# Patient Record
Sex: FEMALE | Race: WHITE | NOT HISPANIC OR LATINO | Employment: OTHER | URBAN - METROPOLITAN AREA
[De-identification: names, ages, dates, MRNs, and addresses within clinical notes are randomized per-mention and may not be internally consistent; named-entity substitution may affect disease eponyms.]

---

## 2020-01-02 ENCOUNTER — OFFICE VISIT (OUTPATIENT)
Dept: ORTHOPEDICS | Facility: CLINIC | Age: 75
End: 2020-01-02
Payer: MEDICARE

## 2020-01-02 VITALS — BODY MASS INDEX: 28.77 KG/M2 | WEIGHT: 152.38 LBS | TEMPERATURE: 98 F | HEIGHT: 61 IN

## 2020-01-02 DIAGNOSIS — M20.21 HALLUX RIGIDUS OF RIGHT FOOT: ICD-10-CM

## 2020-01-02 DIAGNOSIS — M21.611 BUNION OF GREAT TOE OF RIGHT FOOT: Primary | ICD-10-CM

## 2020-01-02 DIAGNOSIS — M20.41 HAMMERTOE OF SECOND TOE OF RIGHT FOOT: ICD-10-CM

## 2020-01-02 LAB
BASOPHILS NFR SNV MANUAL: 0.8 % (ref 0–3)
BUN SERPL-MCNC: 20 MG/DL (ref 7–18)
BUN/CREAT SERPL: 20 RATIO (ref 7–18)
CALCIUM SERPL-MCNC: 9.6 MG/DL (ref 8.8–10.5)
CHLORIDE SERPL-SCNC: 104 MMOL/L (ref 100–108)
CO2 SERPL-SCNC: 31 MMOL/L (ref 21–32)
CREAT SERPL-MCNC: 1 MG/DL (ref 0.55–1.02)
EOSINOPHIL NFR SNV MANUAL: 2.8 % (ref 1–3)
ERYTHROCYTE [DISTWIDTH] IN BLOOD BY AUTOMATED COUNT: 13.9 % (ref 12.5–18)
GFR ESTIMATION: 54
GLUCOSE SERPL-MCNC: 111 MG/DL (ref 70–110)
HCT VFR BLD AUTO: 38.3 % (ref 37–47)
HGB BLD-MCNC: 12.6 G/DL (ref 12–16)
LYMPHOCYTES NFR SNV MANUAL: 21.1 % (ref 25–40)
MANUAL NRBC PER 100 CELLS: 0 %
MCH RBC QN AUTO: 30.7 PG (ref 27–31.2)
MCHC RBC AUTO-ENTMCNC: 32.9 G/DL (ref 31.8–35.4)
MCV RBC AUTO: 93.4 FL (ref 80–97)
MONOCYTES/100 LEUKOCYTES: 7.5 % (ref 1–15)
NEUTROPHILS NFR BLD: 5.81 10*3/UL (ref 1.8–7.7)
NEUTROPHILS NFR SNV MANUAL: 67.5 % (ref 37–80)
PLATELETS: 261 10*3/UL (ref 142–424)
POTASSIUM SERPL-SCNC: 4.7 MMOL/L (ref 3.6–5.2)
RBC # BLD AUTO: 4.1 10*6/UL (ref 4.2–5.4)
SODIUM BLD-SCNC: 141 MMOL/L (ref 135–145)
WBC # BLD: 8.6 10*3/UL (ref 4.6–10.2)

## 2020-01-02 PROCEDURE — 1159F PR MEDICATION LIST DOCUMENTED IN MEDICAL RECORD: ICD-10-PCS | Mod: S$GLB,,, | Performed by: ORTHOPAEDIC SURGERY

## 2020-01-02 PROCEDURE — 99201 PR OFFICE/OUTPT VISIT,NEW,LEVL I: ICD-10-PCS | Mod: S$GLB,,, | Performed by: ORTHOPAEDIC SURGERY

## 2020-01-02 PROCEDURE — 1159F MED LIST DOCD IN RCRD: CPT | Mod: S$GLB,,, | Performed by: ORTHOPAEDIC SURGERY

## 2020-01-02 PROCEDURE — 99201 PR OFFICE/OUTPT VISIT,NEW,LEVL I: CPT | Mod: S$GLB,,, | Performed by: ORTHOPAEDIC SURGERY

## 2020-01-02 RX ORDER — AMLODIPINE BESYLATE 10 MG/1
10 TABLET ORAL DAILY
COMMUNITY

## 2020-01-02 RX ORDER — ATORVASTATIN CALCIUM 40 MG/1
40 TABLET, FILM COATED ORAL DAILY
COMMUNITY

## 2020-01-02 RX ORDER — BENZTROPINE MESYLATE 1 MG/1
1 TABLET ORAL 2 TIMES DAILY
COMMUNITY

## 2020-01-02 RX ORDER — ASPIRIN 325 MG
325 TABLET ORAL DAILY
COMMUNITY

## 2020-01-02 RX ORDER — LOXAPINE SUCCINATE 50 MG/1
50 TABLET ORAL 3 TIMES DAILY
COMMUNITY

## 2020-01-02 RX ORDER — METOPROLOL TARTRATE 25 MG/1
25 TABLET, FILM COATED ORAL 2 TIMES DAILY
COMMUNITY

## 2020-01-08 ENCOUNTER — OUTSIDE PLACE OF SERVICE (OUTPATIENT)
Dept: ORTHOPEDICS | Facility: CLINIC | Age: 75
End: 2020-01-08
Payer: MEDICARE

## 2020-01-08 PROCEDURE — 28820 AMPUTATION OF TOE: CPT | Mod: 51,T6,, | Performed by: ORTHOPAEDIC SURGERY

## 2020-01-08 PROCEDURE — 28289 PR REPAIR HALLUX RIGIDUS; W/O IMPLANT: ICD-10-PCS | Mod: T5,,, | Performed by: ORTHOPAEDIC SURGERY

## 2020-01-08 PROCEDURE — 28820 PR AMPUTATION TOE,MT-P JT: ICD-10-PCS | Mod: 51,T6,, | Performed by: ORTHOPAEDIC SURGERY

## 2020-01-08 PROCEDURE — 28289 CORRJ HALUX RIGDUS W/O IMPLT: CPT | Mod: T5,,, | Performed by: ORTHOPAEDIC SURGERY

## 2020-01-14 ENCOUNTER — OFFICE VISIT (OUTPATIENT)
Dept: ORTHOPEDICS | Facility: CLINIC | Age: 75
End: 2020-01-14
Payer: MEDICARE

## 2020-01-14 DIAGNOSIS — M20.21 HALLUX RIGIDUS OF RIGHT FOOT: Primary | ICD-10-CM

## 2020-01-14 DIAGNOSIS — M20.41 HAMMERTOE OF SECOND TOE OF RIGHT FOOT: ICD-10-CM

## 2020-01-14 PROCEDURE — 99024 PR POST-OP FOLLOW-UP VISIT: ICD-10-PCS | Mod: S$GLB,POP,, | Performed by: ORTHOPAEDIC SURGERY

## 2020-01-14 PROCEDURE — 99024 POSTOP FOLLOW-UP VISIT: CPT | Mod: S$GLB,POP,, | Performed by: ORTHOPAEDIC SURGERY

## 2020-01-14 RX ORDER — CEFADROXIL 500 MG/1
CAPSULE ORAL
COMMUNITY
Start: 2020-01-08

## 2020-01-14 NOTE — PROGRESS NOTES
Subjective:      Patient ID: Milena Chaney is a 74 y.o. female.    Chief Complaint: Post-op Evaluation of the Right Foot    HPI the patient is 1 week postop amputation of her right 2nd toe and cheilectomy of the great toe.  She has not taken any pain medication    ROS unchanged from prior visit      Objective:      Incisions are both clean.  She has minimal swelling. She has slight bloody drainage from the incision at the 2nd toe      Ortho/SPM Exam            Assessment:       Encounter Diagnoses   Name Primary?    Hallux rigidus of right foot Yes    Hammertoe of second toe of right foot           Plan:       Milena was seen today for post-op evaluation.    Diagnoses and all orders for this visit:    Hallux rigidus of right foot    Hammertoe of second toe of right foot      Incisions are redressed.  She will be seen back in 1 week for suture removal

## 2020-01-21 ENCOUNTER — OFFICE VISIT (OUTPATIENT)
Dept: ORTHOPEDICS | Facility: CLINIC | Age: 75
End: 2020-01-21
Payer: MEDICARE

## 2020-01-21 DIAGNOSIS — M20.41 HAMMERTOE OF SECOND TOE OF RIGHT FOOT: ICD-10-CM

## 2020-01-21 DIAGNOSIS — M20.21 HALLUX RIGIDUS OF RIGHT FOOT: Primary | ICD-10-CM

## 2020-01-21 PROCEDURE — 99024 PR POST-OP FOLLOW-UP VISIT: ICD-10-PCS | Mod: S$GLB,POP,, | Performed by: ORTHOPAEDIC SURGERY

## 2020-01-21 PROCEDURE — 99024 POSTOP FOLLOW-UP VISIT: CPT | Mod: S$GLB,POP,, | Performed by: ORTHOPAEDIC SURGERY

## 2020-01-21 NOTE — PROGRESS NOTES
Subjective:      Patient ID: Milena Chaney is a 74 y.o. female.    Chief Complaint: Pain of the Right Foot and Follow-up    HPI patient is 2 weeks postop amputation of her 2nd toe and cheilectomy of her great toe.  She has minimal discomfort    ROS unchanged from prior visit      Objective:      The incisions are well healed.  She has about 40° of extension at the 1st MTP joint.      Ortho/SPM Exam            Assessment:       Encounter Diagnoses   Name Primary?    Hallux rigidus of right foot Yes    Hammertoe of second toe of right foot           Plan:       Milena was seen today for follow-up and pain.    Diagnoses and all orders for this visit:    Hallux rigidus of right foot    Hammertoe of second toe of right foot     Sutures are removed today.  She can begin weaning back into normal footwear.  Return 6 weeks for a final check

## 2020-03-03 ENCOUNTER — OFFICE VISIT (OUTPATIENT)
Dept: ORTHOPEDICS | Facility: CLINIC | Age: 75
End: 2020-03-03
Payer: MEDICARE

## 2020-03-03 DIAGNOSIS — M20.41 HAMMERTOE OF SECOND TOE OF RIGHT FOOT: Primary | ICD-10-CM

## 2020-03-03 PROCEDURE — 99024 PR POST-OP FOLLOW-UP VISIT: ICD-10-PCS | Mod: S$GLB,POP,, | Performed by: ORTHOPAEDIC SURGERY

## 2020-03-03 PROCEDURE — 99024 POSTOP FOLLOW-UP VISIT: CPT | Mod: S$GLB,POP,, | Performed by: ORTHOPAEDIC SURGERY

## 2020-03-03 NOTE — PROGRESS NOTES
Subjective:      Patient ID: Milena Chaney is a 74 y.o. female.    Chief Complaint: Post-op Evaluation of the Right Foot    HPI patient is 1 month postop amputation of her 2nd toe.  She is doing well.  She has no complaints    ROS    unchanged from prior visit  Objective:      Incisions well healed.  There is no swelling or evidence of other complication      Ortho/SPM Exam            Assessment:       Encounter Diagnosis   Name Primary?    Hammertoe of second toe of right foot Yes          Plan:       Milena was seen today for post-op evaluation.    Diagnoses and all orders for this visit:    Hammertoe of second toe of right foot     Return p.r.n.

## 2023-09-30 ENCOUNTER — OUTSIDE PLACE OF SERVICE (OUTPATIENT)
Dept: ADMINISTRATIVE | Facility: OTHER | Age: 78
End: 2023-09-30
Payer: MEDICARE

## 2023-09-30 PROCEDURE — 99223 PR INITIAL HOSPITAL CARE,LEVL III: ICD-10-PCS | Mod: ,,, | Performed by: FAMILY MEDICINE

## 2023-09-30 PROCEDURE — 99223 1ST HOSP IP/OBS HIGH 75: CPT | Mod: ,,, | Performed by: FAMILY MEDICINE

## 2023-10-08 ENCOUNTER — OUTSIDE PLACE OF SERVICE (OUTPATIENT)
Dept: ADMINISTRATIVE | Facility: OTHER | Age: 78
End: 2023-10-08
Payer: MEDICARE

## 2023-10-08 ENCOUNTER — LAB REQUISITION (OUTPATIENT)
Dept: LAB | Facility: HOSPITAL | Age: 78
End: 2023-10-08
Payer: MEDICARE

## 2023-10-08 DIAGNOSIS — E86.0 DEHYDRATION: ICD-10-CM

## 2023-10-08 LAB
ANION GAP SERPL CALC-SCNC: 10 MEQ/L (ref 2–13)
BUN SERPL-MCNC: 13 MG/DL (ref 7–20)
CALCIUM SERPL-MCNC: 9.5 MG/DL (ref 8.4–10.2)
CHLORIDE SERPL-SCNC: 103 MMOL/L (ref 98–110)
CO2 SERPL-SCNC: 26 MMOL/L (ref 21–32)
CREAT SERPL-MCNC: 0.68 MG/DL (ref 0.66–1.25)
CREAT/UREA NIT SERPL: 19 (ref 12–20)
GFR SERPLBLD CREATININE-BSD FMLA CKD-EPI: 89 MLS/MIN/1.73/M2
GLUCOSE SERPL-MCNC: 129 MG/DL (ref 70–115)
POTASSIUM SERPL-SCNC: 3.3 MMOL/L (ref 3.5–5.1)
SODIUM SERPL-SCNC: 139 MMOL/L (ref 135–145)

## 2023-10-08 PROCEDURE — 80048 BASIC METABOLIC PNL TOTAL CA: CPT | Performed by: FAMILY MEDICINE

## 2023-10-08 PROCEDURE — 99231 PR SUBSEQUENT HOSPITAL CARE,LEVL I: ICD-10-PCS | Mod: ,,, | Performed by: FAMILY MEDICINE

## 2023-10-08 PROCEDURE — 99231 SBSQ HOSP IP/OBS SF/LOW 25: CPT | Mod: ,,, | Performed by: FAMILY MEDICINE

## 2023-10-09 ENCOUNTER — OUTSIDE PLACE OF SERVICE (OUTPATIENT)
Dept: ADMINISTRATIVE | Facility: OTHER | Age: 78
End: 2023-10-09
Payer: MEDICARE

## 2023-10-09 PROCEDURE — 99231 PR SUBSEQUENT HOSPITAL CARE,LEVL I: ICD-10-PCS | Mod: ,,, | Performed by: FAMILY MEDICINE

## 2023-10-09 PROCEDURE — 99231 SBSQ HOSP IP/OBS SF/LOW 25: CPT | Mod: ,,, | Performed by: FAMILY MEDICINE

## 2023-10-11 ENCOUNTER — OUTSIDE PLACE OF SERVICE (OUTPATIENT)
Dept: ADMINISTRATIVE | Facility: OTHER | Age: 78
End: 2023-10-11
Payer: MEDICARE

## 2023-10-11 PROCEDURE — 99231 SBSQ HOSP IP/OBS SF/LOW 25: CPT | Mod: ,,, | Performed by: FAMILY MEDICINE

## 2023-10-11 PROCEDURE — 99231 PR SUBSEQUENT HOSPITAL CARE,LEVL I: ICD-10-PCS | Mod: ,,, | Performed by: FAMILY MEDICINE

## 2023-10-17 ENCOUNTER — HOSPITAL ENCOUNTER (OUTPATIENT)
Dept: RADIOLOGY | Facility: HOSPITAL | Age: 78
Discharge: HOME | End: 2023-10-17
Attending: PSYCHIATRY & NEUROLOGY
Payer: MEDICARE

## 2023-10-17 ENCOUNTER — OUTSIDE PLACE OF SERVICE (OUTPATIENT)
Dept: ADMINISTRATIVE | Facility: OTHER | Age: 78
End: 2023-10-17
Payer: MEDICARE

## 2023-10-17 DIAGNOSIS — R09.89 POOR CIRCULATION: ICD-10-CM

## 2023-10-17 PROCEDURE — 99231 SBSQ HOSP IP/OBS SF/LOW 25: CPT | Mod: ,,, | Performed by: FAMILY MEDICINE

## 2023-10-17 PROCEDURE — 93925 LOWER EXTREMITY STUDY: CPT | Mod: TC

## 2023-10-17 PROCEDURE — 99231 PR SUBSEQUENT HOSPITAL CARE,LEVL I: ICD-10-PCS | Mod: ,,, | Performed by: FAMILY MEDICINE

## 2023-12-20 ENCOUNTER — TELEPHONE (OUTPATIENT)
Dept: UROLOGY | Facility: CLINIC | Age: 78
End: 2023-12-20
Payer: MEDICARE

## 2023-12-21 ENCOUNTER — TELEPHONE (OUTPATIENT)
Dept: UROLOGY | Facility: CLINIC | Age: 78
End: 2023-12-21
Payer: MEDICARE

## 2024-01-03 ENCOUNTER — OFFICE VISIT (OUTPATIENT)
Dept: UROLOGY | Facility: CLINIC | Age: 79
End: 2024-01-03
Payer: MEDICARE

## 2024-01-03 VITALS
HEIGHT: 61 IN | HEART RATE: 109 BPM | WEIGHT: 152 LBS | BODY MASS INDEX: 28.7 KG/M2 | DIASTOLIC BLOOD PRESSURE: 71 MMHG | SYSTOLIC BLOOD PRESSURE: 127 MMHG | OXYGEN SATURATION: 97 %

## 2024-01-03 DIAGNOSIS — N32.81 OVERACTIVE BLADDER: Primary | ICD-10-CM

## 2024-01-03 PROCEDURE — 99214 OFFICE O/P EST MOD 30 MIN: CPT | Mod: S$GLB,,, | Performed by: FAMILY MEDICINE

## 2024-01-03 RX ORDER — ZINC SULFATE 50(220)MG
220 CAPSULE ORAL DAILY
COMMUNITY

## 2024-01-03 RX ORDER — ASCORBIC ACID 500 MG
500 TABLET ORAL 2 TIMES DAILY
COMMUNITY

## 2024-01-03 RX ORDER — ALPRAZOLAM 0.25 MG/1
0.25 TABLET ORAL 2 TIMES DAILY PRN
COMMUNITY
Start: 2024-01-02

## 2024-01-03 RX ORDER — RISPERIDONE 1 MG/1
1 TABLET, ORALLY DISINTEGRATING ORAL DAILY
COMMUNITY
Start: 2023-12-11

## 2024-01-03 RX ORDER — ARGININE/GLUTAMINE/CALCIUM BMB 7G-7G-1.5G
1 POWDER IN PACKET (EA) ORAL DAILY
COMMUNITY

## 2024-01-03 RX ORDER — TAMSULOSIN HYDROCHLORIDE 0.4 MG/1
0.4 CAPSULE ORAL NIGHTLY
COMMUNITY

## 2024-01-03 RX ORDER — RISPERIDONE 0.5 MG/1
0.5 TABLET, ORALLY DISINTEGRATING ORAL DAILY
COMMUNITY
Start: 2023-12-27

## 2024-01-03 RX ORDER — CARBIDOPA AND LEVODOPA 25; 100 MG/1; MG/1
1 TABLET, EXTENDED RELEASE ORAL 2 TIMES DAILY
COMMUNITY

## 2024-01-03 RX ORDER — TETRABENAZINE 12.5 MG/1
12.5 TABLET, COATED ORAL DAILY
COMMUNITY
Start: 2023-12-12

## 2024-01-03 RX ORDER — GUAIFENESIN 100 MG/5ML
15 SOLUTION ORAL EVERY 6 HOURS PRN
COMMUNITY

## 2024-01-03 RX ORDER — LEVOTHYROXINE SODIUM 25 UG/1
25 TABLET ORAL
COMMUNITY
Start: 2023-12-11

## 2024-01-04 DIAGNOSIS — N32.89 BLADDER MASS: Primary | ICD-10-CM

## 2024-01-09 ENCOUNTER — OUTSIDE PLACE OF SERVICE (OUTPATIENT)
Dept: FAMILY MEDICINE | Facility: CLINIC | Age: 79
End: 2024-01-09
Payer: MEDICARE

## 2024-01-09 ENCOUNTER — TELEPHONE (OUTPATIENT)
Dept: UROLOGY | Facility: CLINIC | Age: 79
End: 2024-01-09
Payer: MEDICARE

## 2024-01-09 PROCEDURE — 99223 1ST HOSP IP/OBS HIGH 75: CPT | Mod: ,,, | Performed by: FAMILY MEDICINE

## 2024-01-09 NOTE — TELEPHONE ENCOUNTER
Contacted, rescheduled pt. BJP    ----- Message from Shima Ayoub sent at 1/9/2024  9:52 AM CST -----  Contact: Catherine Alexander from [facility] is calling to reschedule patient Milena, whom was scheduled 01/10/2024. You can contact her to reschedule at [phone] ext [phone]

## 2024-01-18 ENCOUNTER — TELEPHONE (OUTPATIENT)
Dept: UROLOGY | Facility: CLINIC | Age: 79
End: 2024-01-18
Payer: MEDICARE

## 2024-01-18 NOTE — TELEPHONE ENCOUNTER
I rescheduled pt's apt to 1/25 @ 10:20. I called pt to let her know, however the phone number listed is no longer in service. Yoselin Shaw     ----- Message from Kemi Jackson sent at 1/18/2024 10:26 AM CST -----  Contact: [facility] is calling on behalf of pt. Pt is hospitalized and needs tomorrows appt rescheduled. Saw an opening for 1/25 @10:20 and would like it scheduled. Please return call to pt at [phone] for any further questions

## 2024-02-05 ENCOUNTER — OFFICE VISIT (OUTPATIENT)
Dept: UROLOGY | Facility: CLINIC | Age: 79
End: 2024-02-05
Payer: MEDICARE

## 2024-02-05 DIAGNOSIS — N32.89 BLADDER MASS: ICD-10-CM

## 2024-02-05 PROCEDURE — 99214 OFFICE O/P EST MOD 30 MIN: CPT | Mod: S$GLB,,, | Performed by: FAMILY MEDICINE

## 2024-02-05 NOTE — PROGRESS NOTES
Subjective:       Patient ID: Milena Chaney is a 78 y.o. female.    Chief Complaint: No chief complaint on file.      HPI: 78-year-old female patient presenting to the clinic to follow up.  This patient has been diagnosed with dementia and other psychologic issues.  She was a very poor historian and unable to provide valuable information.  We contacted nursing facility multiple times to try to determine the reason for referral and to obtain a history.  Referral states possible bladder mass however there was no other information.  Patient denies gross hematuria.  She had a recent CT with no abnormal urologic findings.  No bladder wall thickening no calcifications.  Attempted to obtain urine sample from the patient however she is unable to provide a sample.          Past Medical History:   Past Medical History:   Diagnosis Date    Acute embolism and thrombosis of unspecified deep veins of right lower extremity     Allergic rhinitis     Anxiety disorder, unspecified     Cancer     Constipation     Dementia     Depression     Drug induced subacute dyskinesia     Emotional lability     Hypercholesteremia     Hyperlipidemia     Hypertension     Hypothyroidism, unspecified     Insomnia     Major depressive disorder     Muscle weakness     Neurocognitive disorder with Lewy bodies     Parkinson's disease     Pressure-induced deep tissue damage of unspecified site     Restlessness and agitation     Unilateral osteoarthritis of hip, right     Unilateral osteoarthritis of knee     right    Urine retention        Past Surgical Historical:   Past Surgical History:   Procedure Laterality Date    FOOT SURGERY Right     Amputation of 2nd Toe    Removal of Skin Cancer of Face          Medications:   Medication List with Changes/Refills   Current Medications    ALPRAZOLAM (XANAX) 0.25 MG TABLET    Take 0.25 mg by mouth 2 (two) times daily as needed.    AMLODIPINE (NORVASC) 10 MG TABLET    Take 10 mg by mouth once daily.    APIXABAN  (ELIQUIS) 5 MG TAB    Take 5 mg by mouth 2 (two) times daily.    ARGININE-GLUTAMINE-CALCIUM HMB (CONSUELO) 7-7-1.5 GRAM PWPK    Take 1 packet by mouth once daily.    ASCORBIC ACID, VITAMIN C, (VITAMIN C) 500 MG TABLET    Take 500 mg by mouth 2 (two) times daily.    ASPIRIN 325 MG TABLET    Take 325 mg by mouth once daily.    ATORVASTATIN (LIPITOR) 40 MG TABLET    Take 40 mg by mouth once daily.    BENZTROPINE (COGENTIN) 1 MG TABLET    Take 1 mg by mouth 2 (two) times daily.    CARBIDOPA-LEVODOPA  MG (SINEMET CR)  MG TBSR    Take 1 tablet by mouth 2 (two) times daily.    CEFADROXIL (DURICEF) 500 MG CAP        GUAIFENESIN 100 MG/5 ML (ROBITUSSIN) 100 MG/5 ML SYRUP    Take 15 mLs by mouth every 6 (six) hours as needed for Cough.    LEVOTHYROXINE (SYNTHROID) 25 MCG TABLET    Take 25 mcg by mouth before breakfast.    LOXAPINE SUCCINATE (LOXITANE) 50 MG CAPSULE    Take 50 mg by mouth 3 (three) times daily.    METOPROLOL TARTRATE (LOPRESSOR) 25 MG TABLET    Take 25 mg by mouth 2 (two) times daily.    RISPERIDONE (RISPERDAL M-TABS) 0.5 MG TBDL    Take 0.5 mg by mouth once daily.    RISPERIDONE (RISPERDAL M-TABS) 1 MG TBDL    1 mg once daily.    TAMSULOSIN (FLOMAX) 0.4 MG CAP    Take 0.4 mg by mouth nightly.    TETRABENAZINE (XENAZINE) 12.5 MG TABLET    Take 12.5 mg by mouth once daily.    ZINC SULFATE (ZINCATE) 50 MG ZINC (220 MG) CAPSULE    Take 220 mg by mouth once daily.        Past Social History:   Social History     Socioeconomic History    Marital status:    Tobacco Use    Smoking status: Never   Substance and Sexual Activity    Alcohol use: Not Currently    Drug use: Never       Allergies:   Review of patient's allergies indicates:   Allergen Reactions    Codeine         Family History: No family history on file.     Review of Systems:  Review of Systems   Constitutional:  Negative for activity change, appetite change, chills, diaphoresis, fatigue, fever and unexpected weight change.   HENT:   Negative for congestion, dental problem, drooling, ear discharge, ear pain, facial swelling, hearing loss, mouth sores, nosebleeds, postnasal drip, rhinorrhea, sinus pressure, sinus pain, sneezing, sore throat, tinnitus, trouble swallowing and voice change.    Eyes:  Negative for photophobia, pain, discharge, redness, itching and visual disturbance.   Respiratory:  Negative for apnea, cough, choking, chest tightness, shortness of breath, wheezing and stridor.    Cardiovascular:  Negative for chest pain and leg swelling.   Gastrointestinal:  Negative for abdominal distention, abdominal pain, anal bleeding, blood in stool, constipation, diarrhea, nausea, rectal pain and vomiting.   Endocrine: Negative for cold intolerance, heat intolerance, polydipsia, polyphagia and polyuria.   Genitourinary: Negative.  Negative for decreased urine volume, difficulty urinating, dyspareunia, dysuria, enuresis, flank pain, frequency, genital sores, hematuria, menstrual problem, pelvic pain, urgency, vaginal bleeding, vaginal discharge and vaginal pain.   Musculoskeletal:  Positive for gait problem. Negative for arthralgias, back pain, joint swelling, myalgias, neck pain and neck stiffness.   Skin:  Negative for color change, pallor, rash and wound.   Allergic/Immunologic: Negative for environmental allergies, food allergies and immunocompromised state.   Neurological:  Positive for weakness. Negative for dizziness, tremors, seizures, syncope, facial asymmetry, speech difficulty, light-headedness, numbness and headaches.   Hematological:  Negative for adenopathy. Does not bruise/bleed easily.   Psychiatric/Behavioral:  Positive for behavioral problems. Negative for agitation, confusion, decreased concentration, dysphoric mood, hallucinations, self-injury, sleep disturbance and suicidal ideas. The patient is nervous/anxious. The patient is not hyperactive.        Physical Exam:  Physical Exam  Exam conducted with a chaperone present.    Constitutional:       Appearance: Normal appearance.   HENT:      Head: Normocephalic.      Nose: Nose normal.      Mouth/Throat:      Mouth: Mucous membranes are moist.      Pharynx: Oropharynx is clear.   Eyes:      Extraocular Movements: Extraocular movements intact.      Conjunctiva/sclera: Conjunctivae normal.      Pupils: Pupils are equal, round, and reactive to light.   Cardiovascular:      Rate and Rhythm: Normal rate and regular rhythm.      Pulses: Normal pulses.      Heart sounds: Normal heart sounds.   Pulmonary:      Effort: Pulmonary effort is normal.      Breath sounds: Normal breath sounds.   Abdominal:      General: Abdomen is flat. Bowel sounds are normal.      Palpations: Abdomen is soft.      Hernia: There is no hernia in the left inguinal area or right inguinal area.   Genitourinary:     Pubic Area: No rash or pubic lice.       Labia:         Right: No rash, tenderness, lesion or injury.         Left: No rash, tenderness, lesion or injury.       Urethra: No prolapse, urethral pain, urethral swelling or urethral lesion.   Musculoskeletal:         General: Normal range of motion.      Cervical back: Normal range of motion and neck supple.   Lymphadenopathy:      Lower Body: No right inguinal adenopathy. No left inguinal adenopathy.   Skin:     General: Skin is warm and dry.      Capillary Refill: Capillary refill takes less than 2 seconds.   Neurological:      General: No focal deficit present.      Mental Status: She is alert and oriented to person, place, and time. Mental status is at baseline.         Assessment/Plan:     Reviewed CT report with no abnormal findings.  Patient denies gross hematuria or history of smoking however she has a very poor historian and seems confused from time to time.  Attempted to obtain information from care facility with no beneficial information.  Sent order with the patient for the facility to in and out catheterize the patient for urine sample to determine  hematuria.  Patient has a history of combative dementia.  If needing cystoscopy she may need to proceed in the hospital however at this time I see no indication for cystoscopy.  We will try to obtain more information.  Instructed the care facility to fax urinalysis results to our office.  Problem List Items Addressed This Visit    None  Visit Diagnoses       Bladder mass

## 2024-02-20 NOTE — PROGRESS NOTES
Subjective:       Patient ID: Milena Chaney is a 78 y.o. female.    Chief Complaint: Mass (Bladder )      HPI: 78-year-old female patient presenting to the clinic to follow up.  This patient has been diagnosed with dementia and other psychologic issues.  She was a very poor historian and unable to provide valuable information.  We contacted nursing facility multiple times to try to determine the reason for referral and to obtain a history.  Referral states possible bladder mass however there was no other information.  Patient denies gross hematuria.  She had a recent CT with no abnormal urologic findings.  No bladder wall thickening no calcifications.  Attempted to obtain urine sample from the patient however she is unable to provide a sample.          Past Medical History:   Past Medical History:   Diagnosis Date    Acute embolism and thrombosis of unspecified deep veins of right lower extremity     Allergic rhinitis     Anxiety disorder, unspecified     Cancer     Constipation     Dementia     Depression     Drug induced subacute dyskinesia     Emotional lability     Hypercholesteremia     Hyperlipidemia     Hypertension     Hypothyroidism, unspecified     Insomnia     Major depressive disorder     Muscle weakness     Neurocognitive disorder with Lewy bodies     Parkinson's disease     Pressure-induced deep tissue damage of unspecified site     Restlessness and agitation     Unilateral osteoarthritis of hip, right     Unilateral osteoarthritis of knee     right    Urine retention        Past Surgical Historical:   Past Surgical History:   Procedure Laterality Date    FOOT SURGERY Right     Amputation of 2nd Toe    Removal of Skin Cancer of Face          Medications:   Medication List with Changes/Refills   Current Medications    ALPRAZOLAM (XANAX) 0.25 MG TABLET    Take 0.25 mg by mouth 2 (two) times daily as needed.    AMLODIPINE (NORVASC) 10 MG TABLET    Take 10 mg by mouth once daily.    APIXABAN (ELIQUIS) 5 MG  TAB    Take 5 mg by mouth 2 (two) times daily.    ARGININE-GLUTAMINE-CALCIUM HMB (CONSUELO) 7-7-1.5 GRAM PWPK    Take 1 packet by mouth once daily.    ASCORBIC ACID, VITAMIN C, (VITAMIN C) 500 MG TABLET    Take 500 mg by mouth 2 (two) times daily.    ASPIRIN 325 MG TABLET    Take 325 mg by mouth once daily.    ATORVASTATIN (LIPITOR) 40 MG TABLET    Take 40 mg by mouth once daily.    BENZTROPINE (COGENTIN) 1 MG TABLET    Take 1 mg by mouth 2 (two) times daily.    CARBIDOPA-LEVODOPA  MG (SINEMET CR)  MG TBSR    Take 1 tablet by mouth 2 (two) times daily.    CEFADROXIL (DURICEF) 500 MG CAP        GUAIFENESIN 100 MG/5 ML (ROBITUSSIN) 100 MG/5 ML SYRUP    Take 15 mLs by mouth every 6 (six) hours as needed for Cough.    LEVOTHYROXINE (SYNTHROID) 25 MCG TABLET    Take 25 mcg by mouth before breakfast.    LOXAPINE SUCCINATE (LOXITANE) 50 MG CAPSULE    Take 50 mg by mouth 3 (three) times daily.    METOPROLOL TARTRATE (LOPRESSOR) 25 MG TABLET    Take 25 mg by mouth 2 (two) times daily.    RISPERIDONE (RISPERDAL M-TABS) 0.5 MG TBDL    Take 0.5 mg by mouth once daily.    RISPERIDONE (RISPERDAL M-TABS) 1 MG TBDL    1 mg once daily.    TAMSULOSIN (FLOMAX) 0.4 MG CAP    Take 0.4 mg by mouth nightly.    TETRABENAZINE (XENAZINE) 12.5 MG TABLET    Take 12.5 mg by mouth once daily.    ZINC SULFATE (ZINCATE) 50 MG ZINC (220 MG) CAPSULE    Take 220 mg by mouth once daily.        Past Social History:   Social History     Socioeconomic History    Marital status:    Tobacco Use    Smoking status: Never   Substance and Sexual Activity    Alcohol use: Not Currently    Drug use: Never       Allergies:   Review of patient's allergies indicates:   Allergen Reactions    Codeine         Family History: History reviewed. No pertinent family history.     Review of Systems:  Review of Systems   Constitutional:  Negative for activity change, appetite change, chills, diaphoresis, fatigue, fever and unexpected weight change.   HENT:   Negative for congestion, dental problem, drooling, ear discharge, ear pain, facial swelling, hearing loss, mouth sores, nosebleeds, postnasal drip, rhinorrhea, sinus pressure, sinus pain, sneezing, sore throat, tinnitus, trouble swallowing and voice change.    Eyes:  Negative for photophobia, pain, discharge, redness, itching and visual disturbance.   Respiratory:  Negative for apnea, cough, choking, chest tightness, shortness of breath, wheezing and stridor.    Cardiovascular:  Negative for chest pain and leg swelling.   Gastrointestinal:  Negative for abdominal distention, abdominal pain, anal bleeding, blood in stool, constipation, diarrhea, nausea, rectal pain and vomiting.   Endocrine: Negative for cold intolerance, heat intolerance, polydipsia, polyphagia and polyuria.   Genitourinary: Negative.  Negative for decreased urine volume, difficulty urinating, dyspareunia, dysuria, enuresis, flank pain, frequency, genital sores, hematuria, menstrual problem, pelvic pain, urgency, vaginal bleeding, vaginal discharge and vaginal pain.   Musculoskeletal:  Negative for arthralgias, back pain, gait problem, joint swelling, myalgias, neck pain and neck stiffness.   Skin:  Negative for color change, pallor, rash and wound.   Allergic/Immunologic: Negative for environmental allergies, food allergies and immunocompromised state.   Neurological:  Negative for dizziness, tremors, seizures, syncope, facial asymmetry, speech difficulty, weakness, light-headedness, numbness and headaches.   Hematological:  Negative for adenopathy. Does not bruise/bleed easily.   Psychiatric/Behavioral:  Negative for agitation, behavioral problems, confusion, decreased concentration, dysphoric mood, hallucinations, self-injury, sleep disturbance and suicidal ideas. The patient is not nervous/anxious and is not hyperactive.        Physical Exam:  Physical Exam  Exam conducted with a chaperone present.   Constitutional:       Appearance: Normal  appearance.   HENT:      Head: Normocephalic.      Nose: Nose normal.      Mouth/Throat:      Mouth: Mucous membranes are moist.      Pharynx: Oropharynx is clear.   Eyes:      Extraocular Movements: Extraocular movements intact.      Conjunctiva/sclera: Conjunctivae normal.      Pupils: Pupils are equal, round, and reactive to light.   Cardiovascular:      Rate and Rhythm: Normal rate and regular rhythm.      Pulses: Normal pulses.      Heart sounds: Normal heart sounds.   Pulmonary:      Effort: Pulmonary effort is normal.      Breath sounds: Normal breath sounds.   Abdominal:      General: Abdomen is flat. Bowel sounds are normal.      Palpations: Abdomen is soft.      Hernia: There is no hernia in the left inguinal area or right inguinal area.   Genitourinary:     General: Normal vulva.      Pubic Area: No rash or pubic lice.       Labia:         Right: No rash, tenderness, lesion or injury.         Left: No rash, tenderness, lesion or injury.       Urethra: No prolapse, urethral pain, urethral swelling or urethral lesion.      Rectum: Normal.   Musculoskeletal:         General: Normal range of motion.      Cervical back: Normal range of motion and neck supple.   Lymphadenopathy:      Lower Body: No right inguinal adenopathy. No left inguinal adenopathy.   Skin:     General: Skin is warm and dry.      Capillary Refill: Capillary refill takes less than 2 seconds.   Neurological:      General: No focal deficit present.      Mental Status: She is alert and oriented to person, place, and time. Mental status is at baseline.   Psychiatric:         Mood and Affect: Mood normal.         Behavior: Behavior normal.         Thought Content: Thought content normal.         Judgment: Judgment normal.         Assessment/Plan:     Reviewed CT report with no abnormal findings. Patient denies gross hematuria or history of smoking however she has a very poor historian and seems confused from time to time. Attempted to obtain  information from care facility with no beneficial information. Sent order with the patient for the facility to in and out catheterize the patient for urine sample to determine hematuria. Patient has a history of combative dementia. If needing cystoscopy she may need to proceed in the hospital however at this time I see no indication for cystoscopy. We will try to obtain more information. Instructed the care facility to fax urinalysis results to our office.   Problem List Items Addressed This Visit    None  Visit Diagnoses       Overactive bladder    -  Primary

## 2024-06-14 ENCOUNTER — OUTSIDE PLACE OF SERVICE (OUTPATIENT)
Dept: FAMILY MEDICINE | Facility: CLINIC | Age: 79
End: 2024-06-14
Payer: MEDICARE

## 2024-06-14 PROCEDURE — 99222 1ST HOSP IP/OBS MODERATE 55: CPT | Mod: ,,, | Performed by: FAMILY MEDICINE

## 2024-06-24 ENCOUNTER — OUTSIDE PLACE OF SERVICE (OUTPATIENT)
Dept: FAMILY MEDICINE | Facility: CLINIC | Age: 79
End: 2024-06-24
Payer: MEDICARE

## 2024-06-24 PROCEDURE — 99231 SBSQ HOSP IP/OBS SF/LOW 25: CPT | Mod: ,,, | Performed by: FAMILY MEDICINE

## 2024-10-24 ENCOUNTER — OUTSIDE PLACE OF SERVICE (OUTPATIENT)
Dept: FAMILY MEDICINE | Facility: CLINIC | Age: 79
End: 2024-10-24
Payer: MEDICARE

## 2024-10-24 PROCEDURE — 99223 1ST HOSP IP/OBS HIGH 75: CPT | Mod: ,,, | Performed by: FAMILY MEDICINE

## 2025-03-06 ENCOUNTER — OUTSIDE PLACE OF SERVICE (OUTPATIENT)
Dept: FAMILY MEDICINE | Facility: CLINIC | Age: 80
End: 2025-03-06
Payer: MEDICARE

## 2025-03-18 ENCOUNTER — OUTSIDE PLACE OF SERVICE (OUTPATIENT)
Dept: FAMILY MEDICINE | Facility: CLINIC | Age: 80
End: 2025-03-18
Payer: MEDICARE

## 2025-04-08 ENCOUNTER — OUTSIDE PLACE OF SERVICE (OUTPATIENT)
Dept: FAMILY MEDICINE | Facility: CLINIC | Age: 80
End: 2025-04-08
Payer: MEDICARE

## 2025-04-08 PROCEDURE — 99223 1ST HOSP IP/OBS HIGH 75: CPT | Mod: ,,, | Performed by: FAMILY MEDICINE

## 2025-04-11 ENCOUNTER — OUTSIDE PLACE OF SERVICE (OUTPATIENT)
Dept: FAMILY MEDICINE | Facility: CLINIC | Age: 80
End: 2025-04-11
Payer: MEDICARE

## 2025-04-11 PROCEDURE — 99231 SBSQ HOSP IP/OBS SF/LOW 25: CPT | Mod: ,,, | Performed by: FAMILY MEDICINE

## 2025-06-14 ENCOUNTER — OUTSIDE PLACE OF SERVICE (OUTPATIENT)
Dept: FAMILY MEDICINE | Facility: CLINIC | Age: 80
End: 2025-06-14
Payer: MEDICARE

## 2025-06-14 PROCEDURE — 99222 1ST HOSP IP/OBS MODERATE 55: CPT | Mod: ,,, | Performed by: FAMILY MEDICINE
